# Patient Record
Sex: FEMALE | Race: WHITE
[De-identification: names, ages, dates, MRNs, and addresses within clinical notes are randomized per-mention and may not be internally consistent; named-entity substitution may affect disease eponyms.]

---

## 2019-10-11 ENCOUNTER — HOSPITAL ENCOUNTER (OUTPATIENT)
Dept: HOSPITAL 76 - LAB | Age: 64
Discharge: HOME | End: 2019-10-11
Attending: NURSE PRACTITIONER
Payer: SELF-PAY

## 2019-10-11 DIAGNOSIS — N28.89: ICD-10-CM

## 2019-10-11 DIAGNOSIS — N28.1: ICD-10-CM

## 2019-10-11 DIAGNOSIS — R10.30: Primary | ICD-10-CM

## 2019-10-11 LAB
CREAT SERPLBLD-SCNC: 1 MG/DL (ref 0.4–1)
GFRSERPLBLD MDRD-ARVRAT: 56 ML/MIN/{1.73_M2} (ref 89–?)

## 2019-10-11 PROCEDURE — 36415 COLL VENOUS BLD VENIPUNCTURE: CPT

## 2019-10-11 PROCEDURE — 82565 ASSAY OF CREATININE: CPT

## 2019-10-11 PROCEDURE — 74177 CT ABD & PELVIS W/CONTRAST: CPT

## 2019-10-14 NOTE — CT REPORT
Reason:  FLANK PAIN, LOWER ABDOMINAL PAIN

Procedure Date:  10/11/2019   

Accession Number:  [id number] / [id number]                    

Procedure:  CT  - Abdomen/Pelvis W CPT Code:  

 

FULL RESULT:

 

 

EXAM:

CT ABDOMEN AND PELVIS

 

EXAM DATE: 10/11/2019 11:18 AM.

 

CLINICAL HISTORY: FLANK PAIN, LOWER ABDOMINAL PAIN.

 

COMPARISONS: None.

 

TECHNIQUE: Routine helical CT imaging was performed through the abdomen 

and pelvis. IV contrast: 90 mL Optiray 320. Enteric contrast: Yes. 

Reconstructions: Coronal and sagittal.

 

In accordance with CT protocol optimization, one or more of the following 

dose reduction techniques were utilized for this exam: automated exposure 

control, adjustment of mA and/or KV based on patient size, or use of 

iterative reconstructive technique.

 

FINDINGS:

Lung Bases: Unremarkable.

 

Liver: Few subcentimeter hypodensities in the liver which are too small 

to characterize although probably small cysts. Liver otherwise 

unremarkable.

 

Gallbladder/Bile Ducts: Unremarkable.

 

Spleen: Normal.

 

Pancreas: Normal.

 

Adrenal Glands: Normal.

 

Kidneys: Low lying right kidney. Large left upper pole renal cyst 

measuring 9 cm. Mild bilateral pelviectasis or extrarenal pelves without 

evidence of hydronephrosis or ureteral calculi.

 

Peritoneal Cavity/Bowel: No evidence of bowel obstruction or significant 

bowel thickening. No evidence of acute appendicitis. No free fluid, free 

air or adenopathy. No masses or acute inflammatory process.

 

Pelvic Organs: Normal. The bladder and visualized pelvic organs are 

within normal limits.

 

Vasculature: Mild scattered atherosclerosis. No aneurysms or other 

significant abnormality.

 

Bones: No acute osseous abnormality. Nonspecific lucent lesion with 

sclerotic margin in L3 vertebra and subchondral cystic change at superior 

acetabulum bilaterally.

 

Other: None.

IMPRESSION:

1. No evidence of acute inflammatory or obstructive process in the 

abdomen or pelvis to account for pain.

2. Large left upper pole renal cyst measuring 9 cm.

3. Mild bilateral pelviectasis versus extrarenal pelves without evidence 

of hydronephrosis or ureteral calculi.

4. Low lying right kidney.

 

[facility]

## 2019-11-09 ENCOUNTER — HOSPITAL ENCOUNTER (OUTPATIENT)
Dept: HOSPITAL 76 - DI | Age: 64
Discharge: HOME | End: 2019-11-09
Attending: NURSE PRACTITIONER
Payer: SELF-PAY

## 2019-11-09 DIAGNOSIS — N28.1: Primary | ICD-10-CM

## 2019-11-09 PROCEDURE — 76770 US EXAM ABDO BACK WALL COMP: CPT

## 2019-11-09 NOTE — ULTRASOUND REPORT
Reason:  CYST OF KIDNEY

Procedure Date:  11/09/2019   

Accession Number:  [id number] / [id number]                    

Procedure:  US  - Retroperitoneal CPT Code:  

 

***Final Report***

 

 

FULL RESULT:

 

 

EXAM:

RENAL ULTRASOUND

 

EXAM DATE: 11/9/2019 03:38 PM.

 

CLINICAL HISTORY: CYST OF KIDNEY.

 

COMPARISON: ABDOMEN/PELVIS W/ 10/11/2019 11:11 AM.

 

TECHNIQUE: Real-time scanning was performed with static images obtained.

 

FINDINGS:

Right Kidney: 9.0 x 3.9 x 5.6 cm. Normal echotexture with no stones, 

contour-deforming masses, or hydronephrosis.

 

Left Kidney: 14.5 x 9.0 x 5.8 cm. Normal echotexture with no stones or 

hydronephrosis. At the upper pole is a 10.2 x 7.4 x 8.8 cm cyst with thin 

internal septation and detectable vascular flow corresponding with the 

cyst seen on CT of 10/11/2019.

 

Bladder: Bilateral jets seen. The prevoid bladder volume was 173.1 cc. 

The postvoid bladder volume was 61.4 cc.

 

Other: None.

IMPRESSION: A 10.2 cm cyst in the upper pole of the left kidney with a 

thin internal septation. Given apparent vascularity of the septation, 

recommend further evaluation with renal MRI.

 

[facility]